# Patient Record
Sex: FEMALE | Race: BLACK OR AFRICAN AMERICAN | NOT HISPANIC OR LATINO | Employment: UNEMPLOYED | URBAN - METROPOLITAN AREA
[De-identification: names, ages, dates, MRNs, and addresses within clinical notes are randomized per-mention and may not be internally consistent; named-entity substitution may affect disease eponyms.]

---

## 2018-08-02 ENCOUNTER — HOSPITAL ENCOUNTER (EMERGENCY)
Facility: OTHER | Age: 33
Discharge: HOME | End: 2018-08-02
Attending: EMERGENCY MEDICINE
Payer: MEDICAID

## 2018-08-02 VITALS
TEMPERATURE: 99 F | OXYGEN SATURATION: 99 % | BODY MASS INDEX: 29.23 KG/M2 | RESPIRATION RATE: 16 BRPM | HEIGHT: 59 IN | DIASTOLIC BLOOD PRESSURE: 61 MMHG | WEIGHT: 145 LBS | SYSTOLIC BLOOD PRESSURE: 117 MMHG | HEART RATE: 93 BPM

## 2018-08-02 DIAGNOSIS — G40.909 SEIZURE DISORDER: Primary | ICD-10-CM

## 2018-08-02 DIAGNOSIS — S09.90XA INJURY OF HEAD, INITIAL ENCOUNTER: ICD-10-CM

## 2018-08-02 PROBLEM — S01.01XA LACERATION OF SCALP: Status: ACTIVE | Noted: 2018-08-02

## 2018-08-02 PROCEDURE — 99284 EMERGENCY DEPT VISIT MOD MDM: CPT

## 2018-08-02 PROCEDURE — 25000003 PHARM REV CODE 250: Performed by: EMERGENCY MEDICINE

## 2018-08-02 PROCEDURE — 80175 DRUG SCREEN QUAN LAMOTRIGINE: CPT

## 2018-08-02 RX ORDER — ACETAMINOPHEN 500 MG
1000 TABLET ORAL
Status: COMPLETED | OUTPATIENT
Start: 2018-08-02 | End: 2018-08-02

## 2018-08-02 RX ORDER — LAMOTRIGINE 100 MG/1
100 TABLET ORAL
Status: COMPLETED | OUTPATIENT
Start: 2018-08-02 | End: 2018-08-02

## 2018-08-02 RX ADMIN — ACETAMINOPHEN 1000 MG: 500 TABLET ORAL at 12:08

## 2018-08-02 RX ADMIN — LAMOTRIGINE 100 MG: 100 TABLET ORAL at 11:08

## 2018-08-02 NOTE — ED NOTES
Wound on right parietal region of head cleaned with NS and wound . Moderate dried blood noted to region, small < 0.5 cm lesion noted with localized swelling, no oozing or overt bleeding noted during cleaning. Pt tolerated well.

## 2018-08-02 NOTE — ED TRIAGE NOTES
"Pt Presents to ED via pov  with C/O unwitnessed syncopal episode in bathroom at work this am. Swelling and scant dried blood noted to back of head. Pt states " I do not remember feeling lightheaded/dizzy prior to fainting, Im not sure if I had a seizure but I do have a history of seizures". Currently AAO x 3, Pt currently c/o dizziness. Pt admits to taking Lamictal for seizures. Pt denies chest pain, fever, chills, N/V, diarrhea . Pt appears calm and cooperative but slightly tearful. RN notes small swelling to right side of scalp with scant dried blood.   "

## 2018-08-02 NOTE — ED PROVIDER NOTES
Encounter Date: 8/2/2018    SCRIBE #1 NOTE: Imelda LUNA am scribing for, and in the presence of, Dr. Anderson.       History     Chief Complaint   Patient presents with    Loss of Consciousness     unwitnessed syncope while in bathroom at work. Swelling with controlled bleeding noted to back of head. Pt does not remember feeling lightheaded/dizzy prior to episode. Currently AAO x 3. Pt currently c/o dizziness. Pt also reports history of seizure. Compliant with meds.      Time seen by provider: 11:24 AM    This is a 32 y.o. female with hx of seizures who presents due to LOC one hour ago. Pt is confused and unsure what occurred. She was in the bathroom and then remembers waking up on the floor. She is unsure if she caught a seizure. She has a wound and pain to the posterior aspect of the head. She denies any recent changes to medication. She takes Lamictal BID and last took it yesterday evening. She denies any fever, chills, N/V, dizziness, lightheadedness, weakness, photophobia, and visual disturbance.      The history is provided by the patient.     Review of patient's allergies indicates:  No Known Allergies  Past Medical History:   Diagnosis Date    Seizures      Past Surgical History:   Procedure Laterality Date    TUBAL LIGATION       History reviewed. No pertinent family history.  Social History   Substance Use Topics    Smoking status: Never Smoker    Smokeless tobacco: Never Used    Alcohol use No     Review of Systems   Constitutional: Negative for chills and fever.   HENT: Negative for congestion and sore throat.         Wound and pain to the posterior aspect of the head.   Eyes: Negative for photophobia, redness and visual disturbance.   Respiratory: Negative for cough and shortness of breath.    Cardiovascular: Negative for chest pain.   Gastrointestinal: Negative for abdominal pain, nausea and vomiting.   Genitourinary: Negative for dysuria.   Musculoskeletal: Negative for back pain.    Skin: Negative for rash.   Neurological: Positive for syncope. Negative for dizziness, weakness, light-headedness and headaches.   Psychiatric/Behavioral: Negative for confusion.       Physical Exam     Initial Vitals [08/02/18 1115]   BP Pulse Resp Temp SpO2   123/76 98 16 98.1 °F (36.7 °C) 99 %      MAP       --         Physical Exam    Nursing note and vitals reviewed.  Constitutional: She appears well-developed and well-nourished. She is not diaphoretic. No distress.   HENT:   Head: Normocephalic.   Right Ear: External ear normal.   Left Ear: External ear normal.   Oropharynx is clear and intact.  Moist mucous membranes. Small 0.5cm laceration on the R parietal scalp.   Eyes: Conjunctivae and EOM are normal. Pupils are equal, round, and reactive to light. Right eye exhibits no discharge. Left eye exhibits no discharge.   Conjunctiva are pink, clear, and intact.   Neck: Normal range of motion. Neck supple.   Cardiovascular: Normal rate, regular rhythm and normal heart sounds.   Normal S1, S2. No murmurs, no rubs, no gallops.    Pulmonary/Chest: Breath sounds normal. No respiratory distress. She has no wheezes. She has no rhonchi. She has no rales.   Clear to ascultation bilaterally.   Abdominal: Soft. Bowel sounds are normal. She exhibits no distension. There is no tenderness. There is no rebound and no guarding.   No audible bruits.   Musculoskeletal: Normal range of motion. She exhibits no edema or tenderness.   No midline C-T-L-spine tenderness to palpation, crepitus or step-offs.   Lymphadenopathy:     She has no cervical adenopathy.   Neurological: She is alert and oriented to person, place, and time. She has normal strength. No sensory deficit.   Skin: Skin is warm and dry. No rash noted. No erythema.   Psychiatric: She has a normal mood and affect. Her behavior is normal.         ED Course   Procedures  Labs Reviewed   LAMOTRIGINE LEVEL          Imaging Results          CT Head Without Contrast (Final  result)  Result time 08/02/18 12:19:21    Final result by Zac Levy MD (08/02/18 12:19:21)                 Impression:      Right parietal scalp soft tissue swelling/contusion and laceration without displaced skull fracture or acute intracranial abnormality identified.      Electronically signed by: Zac Levy MD  Date:    08/02/2018  Time:    12:19             Narrative:    EXAMINATION:  CT HEAD WITHOUT CONTRAST    CLINICAL HISTORY:  Trauma;    TECHNIQUE:  Low dose axial CT images obtained throughout the head without intravenous contrast. Sagittal and coronal reconstructions were performed.    COMPARISON:  None.    FINDINGS:  Intracranial compartment:    Ventricles and sulci are normal in size for age without evidence of hydrocephalus. No extra-axial blood or fluid collections.    The brain parenchyma appears normal. No parenchymal mass, hemorrhage, edema or major vascular distribution infarct.    Skull/extracranial contents (limited evaluation): Right parietal scalp soft tissue swelling/contusion.  A few superimposed scattered subcutaneous gas foci, probably representing sequela of laceration.  No radiodense retained foreign body.  No displaced skull fracture.  Mastoid air cells and paranasal sinuses are essentially clear.                               CT Cervical Spine Without Contrast (Final result)  Result time 08/02/18 12:20:59    Final result by Zac Levy MD (08/02/18 12:20:59)                 Impression:      No CT evidence of cervical spine acute osseous traumatic injury.      Electronically signed by: Zac Levy MD  Date:    08/02/2018  Time:    12:20             Narrative:    EXAMINATION:  CT CERVICAL SPINE WITHOUT CONTRAST    CLINICAL HISTORY:  Trauma;    TECHNIQUE:  Low dose axial images, sagittal and coronal reformations were performed though the cervical spine.  Contrast was not administered.    COMPARISON:  None    FINDINGS:  Bones are well mineralized.  Straightening of the cervical  lordosis, likely related to positioning or muscle strain.  Vertebral body and intervertebral disc space heights are maintained.  No displaced fracture, dislocation or significant listhesis.  No prevertebral soft tissue swelling.  Included surrounding soft tissues are within normal limits.  Minimal biapical pleural parenchymal scarring without pneumothorax.                                 Medical Decision Making:   Clinical Tests:   Lab Tests: Ordered and Reviewed  Radiological Study: Ordered            Scribe Attestation:   Scribe #1: I performed the above scribed service and the documentation accurately describes the services I performed. I attest to the accuracy of the note.    Attending Attestation:           Physician Attestation for Scribe:  Physician Attestation Statement for Scribe #1: I, Dr. Anderson, reviewed documentation, as scribed by Imelda Osorio in my presence, and it is both accurate and complete.         Attending ED Notes:   Emergent evaluation 32-year-old female with past medical history of seizure disorder presents the emergency department with seizure and head trauma.  Patient is afebrile, nontoxic appearing with stable vital signs.  Patient is neurovascularly intact without focal neurologic deficits.  No midline C-spine, T-spine or L-spine tenderness to palpation, crepitus or step-offs.  Patient states that she is compliant with her Lamictal.  No recent changes to her medications per patient.  No acute findings on CT of the head.  No acute findings on CT of the cervical spine.  During the course of patient's evaluation in the emergency room patient did not have any further seizure activity.  Patient extensively counseled on her diagnosis and treatment including all diagnostic and physical exam findings.  The patient discharged in good condition and directed to follow up with Neurology in the next 24-48 hours.             Clinical Impression:     1. Seizure disorder    2. Injury of head,  initial encounter                                 Forrest Mckenna MD  08/02/18 4358

## 2018-08-04 LAB — LAMOTRIGINE SERPL-MCNC: 0.3 UG/ML (ref 2–15)

## 2018-10-28 ENCOUNTER — HOSPITAL ENCOUNTER (EMERGENCY)
Facility: OTHER | Age: 33
Discharge: HOME | End: 2018-10-28
Attending: EMERGENCY MEDICINE
Payer: MEDICAID

## 2018-10-28 VITALS
WEIGHT: 125 LBS | OXYGEN SATURATION: 99 % | TEMPERATURE: 99 F | RESPIRATION RATE: 12 BRPM | HEIGHT: 59 IN | SYSTOLIC BLOOD PRESSURE: 108 MMHG | DIASTOLIC BLOOD PRESSURE: 67 MMHG | BODY MASS INDEX: 25.2 KG/M2 | HEART RATE: 104 BPM

## 2018-10-28 DIAGNOSIS — G40.909 SEIZURE DISORDER: ICD-10-CM

## 2018-10-28 DIAGNOSIS — D64.9 ANEMIA, UNSPECIFIED TYPE: ICD-10-CM

## 2018-10-28 DIAGNOSIS — S01.01XA LACERATION OF SCALP, INITIAL ENCOUNTER: ICD-10-CM

## 2018-10-28 DIAGNOSIS — S09.90XA INJURY OF HEAD, INITIAL ENCOUNTER: Primary | ICD-10-CM

## 2018-10-28 DIAGNOSIS — R56.9 SEIZURE: ICD-10-CM

## 2018-10-28 DIAGNOSIS — R00.0 TACHYCARDIA: ICD-10-CM

## 2018-10-28 LAB
ALBUMIN SERPL BCP-MCNC: 4.4 G/DL
ALP SERPL-CCNC: 73 U/L
ALT SERPL W/O P-5'-P-CCNC: 26 U/L
ANION GAP SERPL CALC-SCNC: 11 MMOL/L
AST SERPL-CCNC: 37 U/L
B-HCG UR QL: NEGATIVE
BASOPHILS # BLD AUTO: 0.01 K/UL
BASOPHILS NFR BLD: 0.1 %
BILIRUB SERPL-MCNC: 0.4 MG/DL
BILIRUB UR QL STRIP: NEGATIVE
BUN SERPL-MCNC: 10 MG/DL
CALCIUM SERPL-MCNC: 9.8 MG/DL
CHLORIDE SERPL-SCNC: 105 MMOL/L
CLARITY UR: CLEAR
CO2 SERPL-SCNC: 23 MMOL/L
COLOR UR: YELLOW
CREAT SERPL-MCNC: 0.7 MG/DL
CTP QC/QA: YES
DIFFERENTIAL METHOD: ABNORMAL
EOSINOPHIL # BLD AUTO: 0 K/UL
EOSINOPHIL NFR BLD: 0 %
ERYTHROCYTE [DISTWIDTH] IN BLOOD BY AUTOMATED COUNT: 16.1 %
EST. GFR  (AFRICAN AMERICAN): >60 ML/MIN/1.73 M^2
EST. GFR  (NON AFRICAN AMERICAN): >60 ML/MIN/1.73 M^2
GLUCOSE SERPL-MCNC: 97 MG/DL
GLUCOSE UR QL STRIP: NEGATIVE
HCT VFR BLD AUTO: 34.3 %
HGB BLD-MCNC: 11.1 G/DL
HGB UR QL STRIP: ABNORMAL
KETONES UR QL STRIP: ABNORMAL
LEUKOCYTE ESTERASE UR QL STRIP: NEGATIVE
LYMPHOCYTES # BLD AUTO: 1 K/UL
LYMPHOCYTES NFR BLD: 7.7 %
MCH RBC QN AUTO: 29.4 PG
MCHC RBC AUTO-ENTMCNC: 32.4 G/DL
MCV RBC AUTO: 91 FL
MICROSCOPIC COMMENT: ABNORMAL
MONOCYTES # BLD AUTO: 0.7 K/UL
MONOCYTES NFR BLD: 5.2 %
NEUTROPHILS # BLD AUTO: 10.9 K/UL
NEUTROPHILS NFR BLD: 86.6 %
NITRITE UR QL STRIP: NEGATIVE
PH UR STRIP: >8 [PH] (ref 5–8)
PLATELET # BLD AUTO: 488 K/UL
PMV BLD AUTO: 9.6 FL
POCT GLUCOSE: 99 MG/DL (ref 70–110)
POTASSIUM SERPL-SCNC: 3.5 MMOL/L
PROT SERPL-MCNC: 8.6 G/DL
PROT UR QL STRIP: NEGATIVE
RBC # BLD AUTO: 3.77 M/UL
RBC #/AREA URNS HPF: 5 /HPF (ref 0–4)
SODIUM SERPL-SCNC: 139 MMOL/L
SP GR UR STRIP: 1.01 (ref 1–1.03)
URN SPEC COLLECT METH UR: ABNORMAL
UROBILINOGEN UR STRIP-ACNC: 1 EU/DL
WBC # BLD AUTO: 12.62 K/UL

## 2018-10-28 PROCEDURE — 96360 HYDRATION IV INFUSION INIT: CPT

## 2018-10-28 PROCEDURE — 85025 COMPLETE CBC W/AUTO DIFF WBC: CPT

## 2018-10-28 PROCEDURE — 93005 ELECTROCARDIOGRAM TRACING: CPT

## 2018-10-28 PROCEDURE — 99284 EMERGENCY DEPT VISIT MOD MDM: CPT | Mod: 25

## 2018-10-28 PROCEDURE — 81000 URINALYSIS NONAUTO W/SCOPE: CPT

## 2018-10-28 PROCEDURE — 25000003 PHARM REV CODE 250: Performed by: EMERGENCY MEDICINE

## 2018-10-28 PROCEDURE — 93010 ELECTROCARDIOGRAM REPORT: CPT | Mod: ,,, | Performed by: INTERNAL MEDICINE

## 2018-10-28 PROCEDURE — 82962 GLUCOSE BLOOD TEST: CPT

## 2018-10-28 PROCEDURE — 81025 URINE PREGNANCY TEST: CPT | Performed by: EMERGENCY MEDICINE

## 2018-10-28 PROCEDURE — 80053 COMPREHEN METABOLIC PANEL: CPT

## 2018-10-28 RX ORDER — LAMOTRIGINE 25 MG/1
350 TABLET ORAL DAILY
Status: DISCONTINUED | OUTPATIENT
Start: 2018-10-28 | End: 2018-10-28 | Stop reason: HOSPADM

## 2018-10-28 RX ORDER — ONDANSETRON 4 MG/1
4 TABLET, ORALLY DISINTEGRATING ORAL
Status: COMPLETED | OUTPATIENT
Start: 2018-10-28 | End: 2018-10-28

## 2018-10-28 RX ORDER — LAMOTRIGINE 25 MG/1
25 TABLET ORAL DAILY
Qty: 30 TABLET | Refills: 11 | Status: SHIPPED | OUTPATIENT
Start: 2018-10-28 | End: 2019-10-28

## 2018-10-28 RX ORDER — ACETAMINOPHEN 500 MG
1000 TABLET ORAL
Status: COMPLETED | OUTPATIENT
Start: 2018-10-28 | End: 2018-10-28

## 2018-10-28 RX ADMIN — LAMOTRIGINE 350 MG: 25 TABLET ORAL at 11:10

## 2018-10-28 RX ADMIN — ONDANSETRON 4 MG: 4 TABLET, ORALLY DISINTEGRATING ORAL at 03:10

## 2018-10-28 RX ADMIN — SODIUM CHLORIDE 1000 ML: 0.9 INJECTION, SOLUTION INTRAVENOUS at 08:10

## 2018-10-28 RX ADMIN — ACETAMINOPHEN 1000 MG: 500 TABLET ORAL at 08:10

## 2018-10-28 NOTE — ED NOTES
"Pt presents to ER via [facility] w/ reports of + unwitnessed seizure this AM. Pt reports last seizure x 1 month ago "while at work". Pt denies taking prescribed seizure medications x 6 months " I moved so I don't have my Lamictal for about 6 months". Pt reports hitting right side of head this AM " I had a seizure in the bathroom and I hit my head, I don't know what on". Denies N/V., dizziness or vision changes at this time.   "

## 2018-10-28 NOTE — ED PROVIDER NOTES
Encounter Date: 10/28/2018    SCRIBE #1 NOTE: I, Laura Robertson, am scribing for, and in the presence of, Dr. Anderson.       History     Chief Complaint   Patient presents with    Seizures     pt was found by friends postictal in bathroom. pt pmh seizures. pt not taking meds.     This is a 33 y.o. Female, with history of seizures, who presents with complaint of seizure episode. The patient experienced an unwitnessed seizure this morning. She reports hitting her head on the bathroom floor. Patient denies compliance with Lamictal over six months ago. Per patient, her last seizure episode was one month ago. She denies nausea, vomiting, abdominal pain, chest pain, SOB, headache, dizziness, light headedness, numbness, or weakness.      The history is provided by the patient.     Review of patient's allergies indicates:  No Known Allergies  Past Medical History:   Diagnosis Date    Seizures      Past Surgical History:   Procedure Laterality Date    TUBAL LIGATION       History reviewed. No pertinent family history.  Social History     Tobacco Use    Smoking status: Never Smoker    Smokeless tobacco: Never Used   Substance Use Topics    Alcohol use: No    Drug use: No     Review of Systems   Constitutional: Negative for chills and fever.   HENT: Negative for congestion, rhinorrhea, sore throat and trouble swallowing.    Eyes: Negative for visual disturbance.   Respiratory: Negative for cough and shortness of breath.    Cardiovascular: Negative for chest pain.   Gastrointestinal: Negative for abdominal pain, diarrhea, nausea and vomiting.   Endocrine: Negative.    Genitourinary: Negative for dysuria.   Musculoskeletal: Negative for back pain and neck pain.   Skin: Negative for wound.   Neurological: Positive for seizures.   Psychiatric/Behavioral: Negative for confusion.       Physical Exam     Initial Vitals [10/28/18 0752]   BP Pulse Resp Temp SpO2   109/66 110 18 (!) 101.4 °F (38.6 °C) 98 %      MAP       --          Physical Exam    Nursing note and vitals reviewed.  Constitutional: She appears well-developed and well-nourished. She is not diaphoretic. No distress.   HENT:   Head: Normocephalic.   Right Ear: External ear normal.   Left Ear: External ear normal.   0.5 cm laceration to scalp. Appears to be closed with no sign of infection.   Eyes: EOM are normal. Pupils are equal, round, and reactive to light. Right eye exhibits no discharge. Left eye exhibits no discharge.   Neck: Normal range of motion.   Cardiovascular: Regular rhythm and normal heart sounds. Tachycardia present.  Exam reveals no gallop and no friction rub.    No murmur heard.  Pulmonary/Chest: Breath sounds normal. No respiratory distress. She has no wheezes. She has no rhonchi. She has no rales.   Abdominal: Soft. There is no tenderness. There is no rebound and no guarding.   Musculoskeletal: Normal range of motion. She exhibits no edema or tenderness.   Neurological: She is alert and oriented to person, place, and time. She has normal strength. No sensory deficit.   Skin: Skin is warm and dry. No rash and no abscess noted. No erythema. No pallor.   Psychiatric: She has a normal mood and affect. Her behavior is normal. Judgment and thought content normal.         ED Course   Procedures  Labs Reviewed   URINALYSIS, REFLEX TO URINE CULTURE - Abnormal; Notable for the following components:       Result Value    pH, UA >8.0 (*)     Ketones, UA Trace (*)     Occult Blood UA 1+ (*)     All other components within normal limits    Narrative:     Preferred Collection Type->Urine, Clean Catch   CBC W/ AUTO DIFFERENTIAL - Abnormal; Notable for the following components:    RBC 3.77 (*)     Hemoglobin 11.1 (*)     Hematocrit 34.3 (*)     RDW 16.1 (*)     Platelets 488 (*)     Gran # (ANC) 10.9 (*)     Gran% 86.6 (*)     Lymph% 7.7 (*)     All other components within normal limits   COMPREHENSIVE METABOLIC PANEL - Abnormal; Notable for the following components:     Total Protein 8.6 (*)     All other components within normal limits   URINALYSIS MICROSCOPIC - Abnormal; Notable for the following components:    RBC, UA 5 (*)     All other components within normal limits    Narrative:     Preferred Collection Type->Urine, Clean Catch   POCT URINE PREGNANCY   POCT GLUCOSE     EKG Readings: (Independently Interpreted)   Sinus tachycardia at a rate of 108 bpm.       Imaging Results          CT Head Without Contrast (Final result)  Result time 10/28/18 14:28:54    Final result by Herrera Bartholomew MD (10/28/18 14:28:54)                 Impression:      No acute abnormality.      Electronically signed by: Herrera Bartholomew MD  Date:    10/28/2018  Time:    14:28             Narrative:    EXAMINATION:  CT HEAD WITHOUT CONTRAST    CLINICAL HISTORY:  Trauma;    TECHNIQUE:  Low dose axial images were obtained through the head.  Coronal and sagittal reformations were also performed. Contrast was not administered.    COMPARISON:  August 2, 2018    FINDINGS:  No intracranial hemorrhage or acute infarction.  No intracranial mass or mass effect.  No hydrocephalus.    Right parietal scalp laceration is closed.  Resolution of right parietal scalp hematoma and swelling.    No displaced fracture.  Paranasal sinuses and mastoid air cells are clear.                                 Medical Decision Making:   Clinical Tests:   Lab Tests: Ordered and Reviewed  Radiological Study: Ordered and Reviewed  Medical Tests: Ordered and Reviewed            Scribe Attestation:   Scribe #1: I performed the above scribed service and the documentation accurately describes the services I performed. I attest to the accuracy of the note.    Attending Attestation:           Physician Attestation for Scribe:  Physician Attestation Statement for Scribe #1: I, Dr. Anderson, reviewed documentation, as scribed by Laura Robertson in my presence, and it is both accurate and complete.         Attending ED Notes:   Emergent evaluation  patient presents to the emergency department after a patient is afebrile, nontoxic-appearing stable vital signs except for elevation in heart rate patient is neurovascularly intact without any focal neurologic deficits.  Patient has no elevation white blood cell count.  H&H 11.1 and 34.3.  No acute findings on CMP.  Urinary analysis reveals trace ketones and 1+ blood.  No acute findings on CT of the head.  EKG reveals sinus tachycardia.  Patient has no midline C-spine, T-spine or L-spine tenderness to palpation, crepitus or step-offs.  The patient is extensively counseled her diagnosis and treatment including all diagnostic, laboratory and physical exam findings.  The patient discharged good condition and directed follow-up with Neurology in the next 24-48 hours.             Clinical Impression:     1. Injury of head, initial encounter    2. Seizure    3. Tachycardia    4. Laceration of scalp, initial encounter    5. Seizure disorder    6. Anemia, unspecified type                                   Forrest Mckenna MD  11/06/18 [id number]

## 2018-10-28 NOTE — ED NOTES
SEIZURE PRECAUTIONS INITIATED AT THIS TIME FOR PT SAFETY. FALL RISK BAND APPLIED TO PT. PT REMAINS WITHIN VIEW OF NURSES STATION.

## 2018-10-28 NOTE — ED NOTES
MD aware of current repeat oral temp of 102.6, orders to administer ordered PO Tylenol at this time.

## 2018-10-28 NOTE — ED NOTES
"Reviewed discharge instructions with pt. After removing IV, pt states "I feel like I need to throw up". Pt given emesis bag, pt actively vomiting green emesis, approx 200 mL total volume. MD notified. Awaiting orders.    "

## 2018-10-28 NOTE — ED NOTES
Patient identifiers verified and correct for Elham Loja.    LOC: The patient is awake, alert and aware of environment with an appropriate affect, the patient is oriented x 3 and speaking appropriately.  APPEARANCE: Patient resting comfortably and in no acute distress, patient is clean and well groomed, patient's clothing is properly fastened.  SKIN: The skin is warm and dry, color consistent with ethnicity, patient has normal skin turgor and moist mucus membranes, skin intact, no breakdown or bruising noted.  MUSCULOSKELETAL: Patient moving all extremities spontaneously, no obvious swelling or deformities noted. Pt reports + right posterior headache, no bruising , broken skin, redness or swelling noted to site.   RESPIRATORY: Airway is open and patent, respirations are spontaneous, patient has a normal effort and rate, no accessory muscle use noted, bilateral breath sounds clear in all lobes.  CARDIAC: Patient has a elevated  Heart rate of 110 and regular rhythm, no periphreal edema noted, capillary refill < 3 seconds. Pt denies active chest pains, SOB, dizziness, vision changes, fatigue or weakness at this time.   ABDOMEN: Soft and non tender to palpation, no distention noted. Pt denies active abdominal pains, cramping or discomfort, No dysuria or hematuria at this time.   NEUROLOGIC: PERRL, 3 mm bilaterally, eyes open spontaneously, behavior appropriate to situation, follows commands, facial expression symmetrical, bilateral hand grasp equal and even, purposeful motor response noted, normal sensation in all extremities when touched with a finger. * See full neuro assessment.   FALL RISK BAND APPLIED TO PATIENT.

## 2018-10-28 NOTE — ED NOTES
"Pt states "I'm done throwing up. I'm better now". Informed pt of need for further monitoring for potential future episodes of emesis. Pt verbalizes understanding. Will continue to monitor.   "

## 2018-10-28 NOTE — ED NOTES
Pt resting comfortably in stretcher with eyes closed. No questions, comments, concerns at this time. Pt appears in NAD, AAOx4, RR even and unlabored. Pt able to ambulate to restroom with steady gait. Skin is warm, dry, and intact. Pt remains connected to continuous cardiac monitoring, pulse ox, BP cycled. Will continue to monitor.

## 2025-06-10 ENCOUNTER — HOSPITAL ENCOUNTER (EMERGENCY)
Facility: OTHER | Age: 40
Discharge: HOME | End: 2025-06-10
Attending: EMERGENCY MEDICINE
Payer: MEDICAID

## 2025-06-10 VITALS
OXYGEN SATURATION: 95 % | DIASTOLIC BLOOD PRESSURE: 87 MMHG | BODY MASS INDEX: 29.23 KG/M2 | SYSTOLIC BLOOD PRESSURE: 131 MMHG | HEART RATE: 109 BPM | HEIGHT: 59 IN | TEMPERATURE: 99 F | RESPIRATION RATE: 18 BRPM | WEIGHT: 145 LBS

## 2025-06-10 DIAGNOSIS — J02.0 STREP THROAT: Primary | ICD-10-CM

## 2025-06-10 LAB
B-HCG UR QL: NEGATIVE
CTP QC/QA: YES
GROUP A STREP MOLECULAR (OHS): POSITIVE
HCV AB SERPL QL IA: NEGATIVE
HIV 1+2 AB+HIV1 P24 AG SERPL QL IA: NEGATIVE
HOLD SPECIMEN: NORMAL
POC MOLECULAR INFLUENZA A AGN: NEGATIVE
POC MOLECULAR INFLUENZA B AGN: NEGATIVE
SARS-COV-2 RDRP RESP QL NAA+PROBE: NEGATIVE

## 2025-06-10 PROCEDURE — 25000003 PHARM REV CODE 250: Performed by: NURSE PRACTITIONER

## 2025-06-10 PROCEDURE — 63600175 PHARM REV CODE 636 W HCPCS: Mod: JZ,TB | Performed by: NURSE PRACTITIONER

## 2025-06-10 PROCEDURE — 86803 HEPATITIS C AB TEST: CPT | Performed by: NURSE PRACTITIONER

## 2025-06-10 PROCEDURE — 96372 THER/PROPH/DIAG INJ SC/IM: CPT | Performed by: NURSE PRACTITIONER

## 2025-06-10 PROCEDURE — 81025 URINE PREGNANCY TEST: CPT | Performed by: NURSE PRACTITIONER

## 2025-06-10 PROCEDURE — 99284 EMERGENCY DEPT VISIT MOD MDM: CPT | Mod: 25

## 2025-06-10 PROCEDURE — 87635 SARS-COV-2 COVID-19 AMP PRB: CPT | Performed by: NURSE PRACTITIONER

## 2025-06-10 PROCEDURE — 87389 HIV-1 AG W/HIV-1&-2 AB AG IA: CPT | Performed by: NURSE PRACTITIONER

## 2025-06-10 PROCEDURE — 87651 STREP A DNA AMP PROBE: CPT | Performed by: EMERGENCY MEDICINE

## 2025-06-10 RX ORDER — IBUPROFEN 600 MG/1
600 TABLET, FILM COATED ORAL
Status: COMPLETED | OUTPATIENT
Start: 2025-06-10 | End: 2025-06-10

## 2025-06-10 RX ORDER — ONDANSETRON 4 MG/1
4 TABLET, ORALLY DISINTEGRATING ORAL
Status: COMPLETED | OUTPATIENT
Start: 2025-06-10 | End: 2025-06-10

## 2025-06-10 RX ADMIN — PENICILLIN G BENZATHINE 1.2 MILLION UNITS: 1200000 INJECTION, SUSPENSION INTRAMUSCULAR at 02:06

## 2025-06-10 RX ADMIN — ONDANSETRON 4 MG: 4 TABLET, ORALLY DISINTEGRATING ORAL at 02:06

## 2025-06-10 RX ADMIN — IBUPROFEN 600 MG: 600 TABLET ORAL at 01:06

## 2025-06-10 NOTE — ED PROVIDER NOTES
Encounter Date: 6/10/2025       History     Chief Complaint   Patient presents with    General Illness     Pt presents to the ER with complaints of generalized body aches with fever and vomiting since yesterday.       Patient is a 39-year-old female who reports 1 day of subjective fevers sore throat nausea vomiting and body aches.  Denies congestion runny nose cough diarrhea chest pain constipation urinary symptoms.  She has taken no medications or treatments for her symptoms.  Reports pain is 10/10.    The history is provided by the patient. No  was used.     Review of patient's allergies indicates:  No Known Allergies  Past Medical History:   Diagnosis Date    Seizures      Past Surgical History:   Procedure Laterality Date    TUBAL LIGATION       No family history on file.  Social History[1]  Review of Systems   Constitutional:  Positive for fever.   HENT:  Positive for sore throat.    Gastrointestinal:  Positive for nausea and vomiting.   Musculoskeletal:  Positive for myalgias.       Physical Exam     Initial Vitals [06/10/25 1200]   BP Pulse Resp Temp SpO2   139/89 (!) 111 16 98.2 °F (36.8 °C) 100 %      MAP       --         Physical Exam    Nursing note and vitals reviewed.  Constitutional: She appears well-developed and well-nourished.   HENT:   Head: Normocephalic and atraumatic.   Right Ear: External ear normal.   Left Ear: External ear normal.   Nose: Nose normal. Mouth/Throat: Oropharyngeal exudate present. No posterior oropharyngeal edema or posterior oropharyngeal erythema.   Eyes: Conjunctivae and EOM are normal. Pupils are equal, round, and reactive to light. Right eye exhibits no discharge. Left eye exhibits no discharge.   Neck:   Normal range of motion.  Cardiovascular:  Regular rhythm, S1 normal, S2 normal and normal heart sounds.     Exam reveals no gallop.       No murmur heard.  Pulmonary/Chest: Effort normal and breath sounds normal. No respiratory distress. She has no  decreased breath sounds. She has no wheezes. She has no rhonchi. She has no rales.   Abdominal: She exhibits no distension.   Musculoskeletal:         General: Normal range of motion.      Cervical back: Normal range of motion.     Neurological: She is alert and oriented to person, place, and time.   Skin: Skin is dry. Capillary refill takes less than 2 seconds.         ED Course   Procedures  Labs Reviewed   GROUP A STREP, MOLECULAR - Abnormal       Result Value    Group A Strep Molecular Positive (*)     Narrative:     Arcanobacterium haemolyticum and Beta Streptococcus group C and G will not be detected by this test method.  Please order Throat Culture ([id number]) if suspected.       HEPATITIS C ANTIBODY - Normal    Hep C Ab Interp Negative     HIV 1 / 2 ANTIBODY - Normal    HIV 1/2 Ag/Ab Negative     HEP C VIRUS HOLD SPECIMEN    Extra Tube Hold for add-ons.     SARS-COV-2 RDRP GENE    POC Rapid COVID Negative       Acceptable Yes     POCT INFLUENZA A/B MOLECULAR    POC Molecular Influenza A Ag Negative      POC Molecular Influenza B Ag Negative       Acceptable Yes     POCT URINE PREGNANCY    POC Preg Test, Ur Negative       Acceptable Yes            Imaging Results    None          Medications   ondansetron disintegrating tablet 4 mg (4 mg Oral Given 6/10/25 1403)   ibuprofen tablet 600 mg (600 mg Oral Given 6/10/25 1353)   penicillin G benzathine (BICILLIN LA) injection 1.2 Million Units (1.2 Million Units Intramuscular Given 6/10/25 1431)     Medical Decision Making  Patient is a 39-year-old female who reports 1 day of subjective fevers sore throat nausea vomiting and body aches.  Denies congestion runny nose cough diarrhea chest pain constipation urinary symptoms.  She has taken no medications or treatments for her symptoms.  Reports pain is 10/10.    On physical exam the patient is afebrile nontoxic in no apparent distress breath sounds are clear to auscultation  "heart sounds are normal skin warm dry and intact.  Vital signs are stable and reassuring.  Left ear with complete cerumen impaction right ear without abnormality.  The tonsils are covered with a white coating that maybe exudate or possibly early thrush.    Differential diagnosis includes thrush, strep throat, viral pharyngitis.    Centor Score (Modified/McIsaac) for Strep Pharyngitis - MDCalcCalculated on Dejan 10 2025 2:18 PM2 points -> 11% - 17% Optional rapid strep testing and/or culture.      Problems Addressed:  Strep throat: acute illness or injury     Details: Given Bicillin LA in the emergency department.  Discharged she Cepacol lozenges for her symptom relief.    Amount and/or Complexity of Data Reviewed  Labs: ordered. Decision-making details documented in ED Course.  Discussion of management or test interpretation with external provider(s): Vital signs at the time of disposition were:  /87   Pulse 109   Temp 98.8 °F (37.1 °C) (Oral)   Resp 18   Ht 4' 11" (1.499 m)   Wt 65.8 kg (145 lb)   LMP 05/20/2025   SpO2 95%   Breastfeeding No   BMI 29.29 kg/m²       See AVS for additional recommendations. Medications listed herein were prescribed after reviewing the patient's allergies, medication list, history, most recent laboratories as available.  Referrals below were provided after reviewing the patient's previous medical providers. She understands she  should return for any worsening or changes in condition.  Prior to discharge the patient was asked if she  had any additional concerns or complaints and she declined. The patient was given an opportunity to ask questions and all were answered to her satisfaction.     Risk  OTC drugs.  Prescription drug management.  Diagnosis or treatment significantly limited by social determinants of health.               ED Course as of 06/11/25 0004   Tue Dejan 10, 2025   1349 SARS-CoV-2 RNA, Amplification, Qual: Negative [VC]   1349 POC Molecular Influenza A Ag: " Negative [VC]   1349 POC Molecular Influenza B Ag: Negative [VC]   1349 BP: 139/89 [VC]   1349 Temp: 98.2 °F (36.8 °C) [VC]   1349 Temp Source: Oral [VC]   1349 Pulse(!): 111 [VC]   1349 Resp: 16 [VC]   1349 SpO2: 100 % [VC]   1352 hCG Qualitative, Urine: Negative [VC]   1426 Group A Strep, Molecular(!): Positive [VC]   1427 BP(!): 140/75 [VC]   1555 BP: 131/87 [VC]   1555 Temp: 98.8 °F (37.1 °C) [VC]   1555 Temp Source: Oral [VC]   1555 Pulse: 109 [VC]   1555 Resp: 18 [VC]   1555 SpO2: 95 % [VC]      ED Course User Index  [VC] Arturo Haley DNP                           Clinical Impression:  Final diagnoses:  [J02.0] Strep throat (Primary)          ED Disposition Condition    Discharge Stable          ED Prescriptions    None       Follow-up Information       Follow up With Specialties Details Why Contact Info    Kawasaki, Lumie, MD Internal Medicine Schedule an appointment as soon as possible for a visit   [address] [facility]  [facility] [address]  [phone]            Launch MDCalc MDM  MDCalc MDM Module  Dejan 10 2025 3:23 PM [Arturo Haley]  Data:  - Test/documents: 2 tests ordered  Risk: ondansetron disintegrating tablet + 1 more (Rx drug management)             Arturo Haley DNP  06/10/25 1523       [1]   Social History  Tobacco Use    Smoking status: Never    Smokeless tobacco: Never   Substance Use Topics    Alcohol use: No    Drug use: No        Arturo Haley DNP  06/11/25 0004

## 2025-06-10 NOTE — ED TRIAGE NOTES
38 yo female reports to ed with co bodyaches, subjective fever, N/V, and sore throat onset yesterday. Denies other complaints. Aaox4. Hx of seizures

## 2025-06-10 NOTE — FIRST PROVIDER EVALUATION
"Medical screening examination initiated.  I have conducted a focused provider triage encounter, findings are as follows:    Brief history of present illness:  body aches, subjective fever/chills, vomiting since yesterday    Vitals:    06/10/25 1200   BP: 139/89   BP Location: Left arm   Pulse: (!) 111   Resp: 16   Temp: 98.2 °F (36.8 °C)   TempSrc: Oral   SpO2: 100%   Weight: 65.8 kg (145 lb)   Height: 4' 11" (1.499 m)       Pertinent physical exam:  well appearing, tachy    Brief workup plan:  covid/flu, zofran    Preliminary workup initiated; this workup will be continued and followed by the physician or advanced practice provider that is assigned to the patient when roomed.  "

## 2025-06-10 NOTE — Clinical Note
"Elham Loving" Purvi was seen and treated in our emergency department on 6/10/2025.  She may return to work on 06/13/2025.       If you have any questions or concerns, please don't hesitate to call.      Jared DICK RN    "